# Patient Record
Sex: FEMALE | Race: WHITE | NOT HISPANIC OR LATINO | Employment: FULL TIME | URBAN - METROPOLITAN AREA
[De-identification: names, ages, dates, MRNs, and addresses within clinical notes are randomized per-mention and may not be internally consistent; named-entity substitution may affect disease eponyms.]

---

## 2022-06-29 ENCOUNTER — ANCILLARY PROCEDURE (OUTPATIENT)
Dept: GENERAL RADIOLOGY | Facility: CLINIC | Age: 42
End: 2022-06-29
Attending: NURSE PRACTITIONER
Payer: OTHER MISCELLANEOUS

## 2022-06-29 ENCOUNTER — OFFICE VISIT (OUTPATIENT)
Dept: URGENT CARE | Facility: URGENT CARE | Age: 42
End: 2022-06-29
Payer: OTHER MISCELLANEOUS

## 2022-06-29 VITALS
TEMPERATURE: 98.1 F | WEIGHT: 293 LBS | DIASTOLIC BLOOD PRESSURE: 84 MMHG | SYSTOLIC BLOOD PRESSURE: 134 MMHG | OXYGEN SATURATION: 93 % | HEART RATE: 72 BPM

## 2022-06-29 DIAGNOSIS — S69.91XA INJURY OF RIGHT INDEX FINGER, INITIAL ENCOUNTER: ICD-10-CM

## 2022-06-29 DIAGNOSIS — M25.522 LEFT ELBOW PAIN: ICD-10-CM

## 2022-06-29 DIAGNOSIS — M25.552 HIP PAIN, LEFT: ICD-10-CM
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
## 2022-06-29 DIAGNOSIS — M25.562 ACUTE PAIN OF LEFT KNEE: ICD-10-CM

## 2022-06-29 DIAGNOSIS — M25.531 RIGHT WRIST PAIN: ICD-10-CM

## 2022-06-29 PROBLEM — D72.829 LEUKOCYTOSIS: Status: ACTIVE | Noted: 2022-02-09

## 2022-06-29 PROBLEM — G47.00 INSOMNIA: Status: ACTIVE | Noted: 2022-02-09

## 2022-06-29 PROBLEM — I89.0 LYMPHEDEMA: Status: ACTIVE | Noted: 2022-02-09

## 2022-06-29 PROBLEM — H35.30 DEGENERATION MACULAR: Status: ACTIVE | Noted: 2022-02-09

## 2022-06-29 PROBLEM — M06.9 ARTHRITIS, RHEUMATOID (H): Status: ACTIVE | Noted: 2022-02-09

## 2022-06-29 PROCEDURE — 73110 X-RAY EXAM OF WRIST: CPT | Mod: TC | Performed by: RADIOLOGY

## 2022-06-29 PROCEDURE — 73562 X-RAY EXAM OF KNEE 3: CPT | Mod: TC | Performed by: RADIOLOGY

## 2022-06-29 PROCEDURE — 99203 OFFICE O/P NEW LOW 30 MIN: CPT | Performed by: NURSE PRACTITIONER

## 2022-06-29 PROCEDURE — 73140 X-RAY EXAM OF FINGER(S): CPT | Mod: TC | Performed by: RADIOLOGY

## 2022-06-29 PROCEDURE — 73502 X-RAY EXAM HIP UNI 2-3 VIEWS: CPT | Mod: TC | Performed by: RADIOLOGY

## 2022-06-29 PROCEDURE — 73070 X-RAY EXAM OF ELBOW: CPT | Mod: TC | Performed by: RADIOLOGY

## 2022-06-29 RX ORDER — DICLOFENAC SODIUM 75 MG/1
TABLET, DELAYED RELEASE ORAL
COMMUNITY
Start: 2022-05-13

## 2022-06-29 NOTE — PROGRESS NOTES
Assessment & Plan     1. Fall, initial encounter    - XR Finger Right G/E 2 Views  - XR Wrist Right G/E 3 Views  - XR Elbow Left 2 Views  - XR Hip Left 2-3 Views  - XR Knee Left 3 Views    2. Injury of right index finger, initial encounter    - XR Finger Right G/E 2 Views  - XR Wrist Right G/E 3 Views    3. Right wrist pain    - XR Wrist Right G/E 3 Views    4. Acute pain of left knee    - XR Knee Left 3 Views    5. Hip pain, left    - XR Hip Left 2-3 Views    6. Left elbow pain    - XR Elbow Left 2 Views    Patient Instructions   Recommend ice every 2-3 hours areas of discomfort for approximately 10 minutes for following 3 days.  Tylenol as needed for discomfort.                PATY [facility]    Morris Urban is a 41 year old female who presents to clinic today for the following health issues:  Chief Complaint   Patient presents with     Work Comp     Pt fell at work and landed on right hand, left arm and down her knee, happened yesterday morning coming out of [employer]. 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 Having pain in left ankle/shin, left knee lateral, left hip, right hand medial scaphoid area and index finger.       Review of Systems  Constitutional, HEENT, cardiovascular, pulmonary, gi and gu systems are negative, except as otherwise noted.      Objective    /84 (BP Location: Left arm, Patient Position: Sitting, Cuff Size: Adult Large)   Pulse 72   Temp 98.1  F (36.7  C) (Axillary)   Wt (!) 159.9 kg (352 lb 9.6 oz)   SpO2 93%   Physical Exam   GENERAL: healthy, alert and no distress  NECK: no adenopathy, no asymmetry, masses, or scars and thyroid normal to palpation  RESP: lungs clear to auscultation - no rales,  rhonchi or wheezes  CV: regular rate and rhythm, normal S1 S2, no S3 or S4, no murmur, click or rub, no peripheral edema and peripheral pulses strong  ABDOMEN: soft, nontender, no hepatosplenomegaly, no masses and bowel sounds normal  MS: Right hand index finger pain with range of motion of all joints.  Stiffness and mild swelling in this area negative for erythema or warmth.  Wrist area over scaphoid is mildly inflamed and tender.  Negative for erythema or warmth negative for ecchymosis.  Left elbow tenderness with range of motion negative for erythema warmth or inflammation.  Left, hip tenderness with palpation tenderness with range of motion difficult assessment due to body habitus negative for ecchymosis or crepitus.  Left knee negative for crepitus mild tenderness lateral aspect near lateral ligament area no inflammation noted.  Normal CMS normal pulses bilateral lower extremities bilateral upper extremities.  SKIN: Small scrape on left knee dry intact  NEURO: Normal strength and tone, mentation intact and speech normal    Results for orders placed or performed in visit on 06/29/22   XR Finger Right G/E 2 Views     Status: None    Narrative    FINGER RIGHT TWO OR MORE VIEWS   6/29/2022 3:47 PM     HISTORY: Right index finger pain after fall.    COMPARISON: None.      Impression    IMPRESSION: Normal joint spacing and alignment. No evidence of acute  fracture.    BRIDGET SANDS MD         SYSTEM ID:  [id number]   XR Wrist Right G/E 3 Views     Status: None    Narrative    WRIST RIGHT THREE OR MORE VIEWS   6/29/2022 3:45 PM     HISTORY: Pain after fall.    COMPARISON: None.      Impression    IMPRESSION: Normal joint spacing and alignment. No evidence of acute  fracture. Mild ulna minus variance.    BRIDGET SANDS MD         SYSTEM ID:  [id number]   XR Elbow Left 2 Views     Status: None    Narrative    ELBOW LEFT TWO VIEW   6/29/2022 3:45 PM     HISTORY: Pain after fall.    COMPARISON: None.      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 No evidence of acute fracture or joint effusion.    BRIDGET SANDS MD         SYSTEM ID:  [id number]   XR Hip Left 2-3 Views     Status: None    Narrative    HIP LEFT TWO - THREE VIEWS   6/29/2022 3:46 PM     HISTORY: Fall, initial encounter; Hip pain, left.    COMPARISON: None.      Impression    IMPRESSION: Normal joint spacing and alignment. No evidence of acute  fracture.    BRIDGET SANDS MD         SYSTEM ID:  [id number]   XR Knee Left 3 Views     Status: None    Narrative    KNEE LEFT THREE VIEW   6/29/2022 3:46 PM     HISTORY: Fall, initial encounter; Acute pain of left knee.    COMPARISON: None.      Impression    IMPRESSION: Mild medial compartment joint space narrowing with early  marginal osteophyte formation. Marginal osteophyte formation lateral  compartment without joint space narrowing. Mild patellofemoral  marginal osteophyte formation without significant joint space  narrowing. No evidence of acute fracture or joint effusion.    BRIDGET SANDS MD         SYSTEM ID:  [id number]

## 2022-06-29 NOTE — RESULT ENCOUNTER NOTE
Results discussed with patient in clinic. States understanding of these results.    Jena Santoyo CNP